# Patient Record
Sex: MALE | Race: BLACK OR AFRICAN AMERICAN | NOT HISPANIC OR LATINO
[De-identification: names, ages, dates, MRNs, and addresses within clinical notes are randomized per-mention and may not be internally consistent; named-entity substitution may affect disease eponyms.]

---

## 2023-08-11 ENCOUNTER — APPOINTMENT (OUTPATIENT)
Dept: INTERNAL MEDICINE | Facility: CLINIC | Age: 33
End: 2023-08-11
Payer: COMMERCIAL

## 2023-08-11 VITALS
HEART RATE: 68 BPM | DIASTOLIC BLOOD PRESSURE: 74 MMHG | OXYGEN SATURATION: 98 % | SYSTOLIC BLOOD PRESSURE: 112 MMHG | BODY MASS INDEX: 25.31 KG/M2 | WEIGHT: 191 LBS | HEIGHT: 73 IN

## 2023-08-11 DIAGNOSIS — M54.50 LOW BACK PAIN, UNSPECIFIED: ICD-10-CM

## 2023-08-11 PROBLEM — Z00.00 ENCOUNTER FOR PREVENTIVE HEALTH EXAMINATION: Status: ACTIVE | Noted: 2023-08-11

## 2023-08-11 PROCEDURE — 99204 OFFICE O/P NEW MOD 45 MIN: CPT

## 2023-08-11 RX ORDER — METHOCARBAMOL 750 MG/1
750 TABLET, FILM COATED ORAL EVERY 8 HOURS
Qty: 30 | Refills: 0 | Status: ACTIVE | COMMUNITY
Start: 2023-08-11 | End: 1900-01-01

## 2023-08-11 NOTE — HEALTH RISK ASSESSMENT
[HIV Test offered] : HIV Test offered [Hepatitis C test offered] : Hepatitis C test offered [Change in mental status noted] : No change in mental status noted [Language] : denies difficulty with language [Behavior] : denies difficulty with behavior [Learning/Retaining New Information] : denies difficulty learning/retaining new information [Handling Complex Tasks] : denies difficulty handling complex tasks [Reasoning] : denies difficulty with reasoning [Spatial Ability and Orientation] : denies difficulty with spatial ability and orientation [None] : None [With Family] : lives with family [Employed] : employed [College] : College [Single] : single [Sexually Active] : sexually active [High Risk Behavior] : no high risk behavior [Feels Safe at Home] : Feels safe at home [Fully functional (bathing, dressing, toileting, transferring, walking, feeding)] : Fully functional (bathing, dressing, toileting, transferring, walking, feeding) [Fully functional (using the telephone, shopping, preparing meals, housekeeping, doing laundry, using] : Fully functional and needs no help or supervision to perform IADLs (using the telephone, shopping, preparing meals, housekeeping, doing laundry, using transportation, managing medications and managing finances) [Reports changes in hearing] : Reports no changes in hearing [Reports changes in vision] : Reports no changes in vision [Reports normal functional visual acuity (ie: able to read med bottle)] : Reports normal functional visual acuity [Reports changes in dental health] : Reports no changes in dental health [Smoke Detector] : smoke detector [Carbon Monoxide Detector] : carbon monoxide detector [Guns at Home] : no guns at home [Safety elements used in home] : safety elements used in home [Seat Belt] :  uses seat belt [Sunscreen] : uses sunscreen [Travel to Developing Areas] : does not  travel to developing areas [TB Exposure] : is not being exposed to tuberculosis [Caregiver Concerns] : does not have caregiver concerns [No] : No [No falls in past year] : Patient reported no falls in the past year [0] : 2) Feeling down, depressed, or hopeless: Not at all (0) [PHQ-2 Negative - No further assessment needed] : PHQ-2 Negative - No further assessment needed [Never] : Never [[id number]] : 0

## 2023-08-11 NOTE — PHYSICAL EXAM
[No Acute Distress] : no acute distress [Well Nourished] : well nourished [Well Developed] : well developed [Well-Appearing] : well-appearing [Normal Sclera/Conjunctiva] : normal sclera/conjunctiva [PERRL] : pupils equal round and reactive to light [EOMI] : extraocular movements intact [Normal Outer Ear/Nose] : the outer ears and nose were normal in appearance [Normal Oropharynx] : the oropharynx was normal [No Lymphadenopathy] : no lymphadenopathy [Supple] : supple [Thyroid Normal, No Nodules] : the thyroid was normal and there were no nodules present [No Respiratory Distress] : no respiratory distress  [No Accessory Muscle Use] : no accessory muscle use [Clear to Auscultation] : lungs were clear to auscultation bilaterally [Normal Rate] : normal rate  [Regular Rhythm] : with a regular rhythm [Normal S1, S2] : normal S1 and S2 [No Murmur] : no murmur heard [No Edema] : there was no peripheral edema [Declined Breast Exam] : declined breast exam  [Soft] : abdomen soft [Non Tender] : non-tender [Non-distended] : non-distended [No Masses] : no abdominal mass palpated [No HSM] : no HSM [Normal Bowel Sounds] : normal bowel sounds [Declined Rectal Exam] : declined rectal exam [Normal Posterior Cervical Nodes] : no posterior cervical lymphadenopathy [Normal Anterior Cervical Nodes] : no anterior cervical lymphadenopathy [No CVA Tenderness] : no CVA  tenderness [No Spinal Tenderness] : no spinal tenderness [No Rash] : no rash [Coordination Grossly Intact] : coordination grossly intact [No Focal Deficits] : no focal deficits [Normal Gait] : normal gait [Deep Tendon Reflexes (DTR)] : deep tendon reflexes were 2+ and symmetric [de-identified] : declined [Normal] : normal gait, coordination grossly intact, no focal deficits and deep tendon reflexes were 2+ and symmetric [No Joint Swelling] : no joint swelling [Normal Affect] : the affect was normal [Grossly Normal Strength/Tone] : grossly normal strength/tone [Normal Insight/Judgement] : insight and judgment were intact [de-identified] : straight leg test negative b/l

## 2023-08-11 NOTE — REVIEW OF SYSTEMS
[Back Pain] : back pain [Negative] : Musculoskeletal [Joint Stiffness] : no joint stiffness [Joint Pain] : no joint pain [Muscle Pain] : no muscle pain [Muscle Weakness] : no muscle weakness [Joint Swelling] : no joint swelling

## 2023-08-11 NOTE — PHYSICAL EXAM
[No Acute Distress] : no acute distress [Well Nourished] : well nourished [Well Developed] : well developed [Well-Appearing] : well-appearing [Normal Sclera/Conjunctiva] : normal sclera/conjunctiva [PERRL] : pupils equal round and reactive to light [EOMI] : extraocular movements intact [Normal Outer Ear/Nose] : the outer ears and nose were normal in appearance [Normal Oropharynx] : the oropharynx was normal [No Lymphadenopathy] : no lymphadenopathy [Supple] : supple [Thyroid Normal, No Nodules] : the thyroid was normal and there were no nodules present [No Respiratory Distress] : no respiratory distress  [No Accessory Muscle Use] : no accessory muscle use [Clear to Auscultation] : lungs were clear to auscultation bilaterally [Normal Rate] : normal rate  [Regular Rhythm] : with a regular rhythm [Normal S1, S2] : normal S1 and S2 [No Murmur] : no murmur heard [No Edema] : there was no peripheral edema [Declined Breast Exam] : declined breast exam  [Soft] : abdomen soft [Non Tender] : non-tender [Non-distended] : non-distended [No Masses] : no abdominal mass palpated [No HSM] : no HSM [Normal Bowel Sounds] : normal bowel sounds [Declined Rectal Exam] : declined rectal exam [Normal Posterior Cervical Nodes] : no posterior cervical lymphadenopathy [Normal Anterior Cervical Nodes] : no anterior cervical lymphadenopathy [No CVA Tenderness] : no CVA  tenderness [No Spinal Tenderness] : no spinal tenderness [No Rash] : no rash [Coordination Grossly Intact] : coordination grossly intact [No Focal Deficits] : no focal deficits [Normal Gait] : normal gait [Deep Tendon Reflexes (DTR)] : deep tendon reflexes were 2+ and symmetric [de-identified] : declined [Normal] : normal gait, coordination grossly intact, no focal deficits and deep tendon reflexes were 2+ and symmetric [No Joint Swelling] : no joint swelling [Normal Affect] : the affect was normal [Grossly Normal Strength/Tone] : grossly normal strength/tone [Normal Insight/Judgement] : insight and judgment were intact [de-identified] : straight leg test negative b/l

## 2023-08-11 NOTE — REVIEW OF SYSTEMS
[Back Pain] : back pain [Negative] : Musculoskeletal [Joint Pain] : no joint pain [Joint Stiffness] : no joint stiffness [Muscle Pain] : no muscle pain [Muscle Weakness] : no muscle weakness [Joint Swelling] : no joint swelling

## 2023-08-11 NOTE — HISTORY OF PRESENT ILLNESS
[de-identified] : 33 year M here for Complete Physical Exam. Pt is daily exercising?  Yes or No Vaccinations: covid  flu  tdap hpv  Screening: colonoscopy: denies any unintentional weight loss, blood in stool, anemia or dysphagia of solids or liquids no family hx of colon cancer  -Past Medical History:   -Allergies:  -Medications:  -Surgical Hx:  -Family Hx: Mother Father Maternal Grandfather Maternal Grandmother Paternal Grandfather Paternal Grandmother Siblings: Children: :   pt denies any family hx of breast, colon, cervical, endometrial, uterine, ovarian,  lung, prostate  or testicular cancer  -Social ETOH - socially  Smoker - denies  Illicit Drug use - denies  -Occupation:  Pt has no acute complaints  [FreeTextEntry8] : 33 M  Patient c/o of back pain located L side x 1 month. Pt [place] was [place] beach [place] up [place] and felt sharp pain in left side of back and reports pain was severe. Pt went to urgent care via tele health with [facility].  the pain started on 2/10 located on left side radiates to back only. aggravated by movement and exertion.  alleviated by lying down, standing better.  no trauma or fall or injury associated with no other sx.  taking ibuprofen yesterday - mild relief was rx muscle relaxant by tele visit - no relief  ice/heat for pain denies.  Pt reports no numbness, tingling, weakness or loss of sensation.  No fever, no rash, no weakness or sensory changes No urinary retention or overflow incontinence, no bowel movement changes or saddle paresthesia. No history of cancer  PMHX: Asthma Allergies: NKDA, nut allergry - lips swelling and throat swelling but no anaphylaxis  Medications: Albuterol Inhaler  Surgical Hx: ACL surgery R knee  [place] Teeth  Family Hx; Mother : healthy and alive Father healthy and alive Siblings healthy and alive Social Hx ETOH  -  socially Tobacco - denies Illict Drug use - denies Occupation:  - [place]

## 2023-08-11 NOTE — ASSESSMENT
[FreeTextEntry1] : #Left side back pain - f/u MRI of lumbar - f/u PT - f/u Pain managment - robaxin 750mg q 8 hours prn for pain - tylenol for pain no red flags Physical activity as tolerated, encouraged pt to stay active Reassured pain will most likely resolve in a few weeks Lifestyle modification, good lifting techniques   pt agrees and understands plan via teach back method. all questions answered. A total of 45 minutes including same day pre-visit chart review, face to face time with patient, coordination of care, and documentation was spent on this visit.

## 2023-08-11 NOTE — HISTORY OF PRESENT ILLNESS
[de-identified] : 33 year M here for Complete Physical Exam. Pt is daily exercising?  Yes or No Vaccinations: covid  flu  tdap hpv  Screening: colonoscopy: denies any unintentional weight loss, blood in stool, anemia or dysphagia of solids or liquids no family hx of colon cancer  -Past Medical History:   -Allergies:  -Medications:  -Surgical Hx:  -Family Hx: Mother Father Maternal Grandfather Maternal Grandmother Paternal Grandfather Paternal Grandmother Siblings: Children: :   pt denies any family hx of breast, colon, cervical, endometrial, uterine, ovarian,  lung, prostate  or testicular cancer  -Social ETOH - socially  Smoker - denies  Illicit Drug use - denies  -Occupation:  Pt has no acute complaints  [FreeTextEntry8] : 33 M  Patient c/o of back pain located L side x 1 month. Pt reports was at beach picking up son and felt sharp pain in left side of back and reports pain was severe. Pt went to urgent care via tele health with [facility].  the pain started on 2/10 located on left side radiates to back only. aggravated by movement and exertion.  alleviated by lying down, standing better.  no trauma or fall or injury associated with no other sx.  taking ibuprofen yesterday - mild relief was rx muscle relaxant by tele visit - no relief  ice/heat for pain denies.  Pt reports no numbness, tingling, weakness or loss of sensation.  No fever, no rash, no weakness or sensory changes No urinary retention or overflow incontinence, no bowel movement changes or saddle paresthesia. No history of cancer  PMHX: Asthma Allergies: NKDA, nut allergry - lips swelling and throat swelling but no anaphylaxis  Medications: Albuterol Inhaler  Surgical Hx: ACL surgery R knee  [place] Teeth  Family Hx; Mother : healthy and alive Father healthy and alive Siblings healthy and alive Social Hx ETOH  -  socially Tobacco - denies Illict Drug use - denies Occupation:  - [place]

## 2023-08-11 NOTE — HEALTH RISK ASSESSMENT
[HIV Test offered] : HIV Test offered [Hepatitis C test offered] : Hepatitis C test offered [Change in mental status noted] : No change in mental status noted [Language] : denies difficulty with language [Behavior] : denies difficulty with behavior [Learning/Retaining New Information] : denies difficulty learning/retaining new information [Handling Complex Tasks] : denies difficulty handling complex tasks [Reasoning] : denies difficulty with reasoning [Spatial Ability and Orientation] : denies difficulty with spatial ability and orientation [None] : None [With Family] : lives with family [Employed] : employed [College] : College [Single] : single [Sexually Active] : sexually active [High Risk Behavior] : no high risk behavior [Feels Safe at Home] : Feels safe at home [Fully functional (bathing, dressing, toileting, transferring, walking, feeding)] : Fully functional (bathing, dressing, toileting, transferring, walking, feeding) [Fully functional (using the telephone, shopping, preparing meals, housekeeping, doing laundry, using] : Fully functional and needs no help or supervision to perform IADLs (using the telephone, shopping, preparing meals, housekeeping, doing laundry, using transportation, managing medications and managing finances) [Reports changes in hearing] : Reports no changes in hearing [Reports changes in vision] : Reports no changes in vision [Reports normal functional visual acuity (ie: able to read med bottle)] : Reports normal functional visual acuity [Reports changes in dental health] : Reports no changes in dental health [Smoke Detector] : smoke detector [Carbon Monoxide Detector] : carbon monoxide detector [Guns at Home] : no guns at home [Safety elements used in home] : safety elements used in home [Seat Belt] :  uses seat belt [Sunscreen] : uses sunscreen [Travel to Developing Areas] : does not  travel to developing areas [TB Exposure] : is not being exposed to tuberculosis [Caregiver Concerns] : does not have caregiver concerns [No] : No [No falls in past year] : Patient reported no falls in the past year [0] : 2) Feeling down, depressed, or hopeless: Not at all (0) [PHQ-2 Negative - No further assessment needed] : PHQ-2 Negative - No further assessment needed [Never] : Never [[id number]] : 0

## 2023-08-18 ENCOUNTER — APPOINTMENT (OUTPATIENT)
Dept: MRI IMAGING | Facility: CLINIC | Age: 33
End: 2023-08-18
Payer: SELF-PAY

## 2023-08-18 PROCEDURE — 72148 MRI LUMBAR SPINE W/O DYE: CPT

## 2023-08-21 ENCOUNTER — NON-APPOINTMENT (OUTPATIENT)
Age: 33
End: 2023-08-21

## 2023-08-21 DIAGNOSIS — N28.1 CYST OF KIDNEY, ACQUIRED: ICD-10-CM

## 2023-08-24 ENCOUNTER — APPOINTMENT (OUTPATIENT)
Dept: ORTHOPEDIC SURGERY | Facility: CLINIC | Age: 33
End: 2023-08-24
Payer: COMMERCIAL

## 2023-08-24 VITALS
OXYGEN SATURATION: 97 % | BODY MASS INDEX: 24.38 KG/M2 | DIASTOLIC BLOOD PRESSURE: 78 MMHG | SYSTOLIC BLOOD PRESSURE: 114 MMHG | HEART RATE: 70 BPM | WEIGHT: 190 LBS | HEIGHT: 74 IN

## 2023-08-24 DIAGNOSIS — M48.07 SPINAL STENOSIS, LUMBOSACRAL REGION: ICD-10-CM

## 2023-08-24 PROCEDURE — 99204 OFFICE O/P NEW MOD 45 MIN: CPT

## 2023-08-24 RX ORDER — METHYLPREDNISOLONE 4 MG/1
4 TABLET ORAL
Qty: 1 | Refills: 1 | Status: ACTIVE | COMMUNITY
Start: 2023-08-24 | End: 1900-01-01

## 2023-08-24 NOTE — DISCUSSION/SUMMARY
[Medication Risks Reviewed] : Medication risks reviewed [Surgical risks reviewed] : Surgical risks reviewed [de-identified] : L4-L5 left herniation. Discussed all options. Referral for physical therapy.  If no better, APOLLO with pain management. Risks of surgery include infection, dural tear, nerve root injury, reherniation, future leg pain, future back pain, retained fragment, hematoma, urinary retention, worsening leg symptoms, foot drop, anesthetic risks, blood transfusion risks, positioning pain, visceral vascular injury, deep vein thrombosis, pulmonary embolus, and death. All risks were explained not exclusive to the ones mentioned alternatives were discussed and all questions were answered the patient agrees and understands the above and is in complete agreement with the plan.  All options discussed including rest, medicine, home exercise, acupuncture, Chiropractic care, Physical Therapy, Pain management, and last resort surgery. All questions were answered, all alternatives discussed, and the patient is in complete agreement with the treatment plan which the patient contributed to and discussed with me through the shared decision-making process. Follow-up appointment as instructed. Any issues and the patient will call or come in sooner.

## 2023-08-24 NOTE — PHYSICAL EXAM
[Normal] : Gait: normal [SLR] : positive straight leg raise [Camarena's Sign] : negative Camarena's sign [Pronator Drift] : negative pronator drift [de-identified] : 5 out of 5 motor strength, sensation is intact and symmetrical full range of motion flexion extension and rotation, no palpatory tenderness full range of motion of hips knees shoulders and elbows (all four extremities), no atrophy, negative straight leg raise, no pathological reflexes, no swelling, normal ambulation, no apparent distress skin is intact, no palpable lymph nodes, no upper or lower extremity instability, alert and oriented x3 and normal mood. Normal finger-to nose test.  4+/5 left foot +left SLR [de-identified] : I reviewed, interpreted and clinically correlated the following outside imaging studies,   EXAM: [id number] - MR SPINE LUMBAR  - ORDERED BY: IVONNE PARR   PROCEDURE DATE:  08/18/2023    INTERPRETATION:  CLINICAL STATEMENT: Left lower back pain.  TECHNIQUE Multiplanar multisequence noncontrast MRI of the lumbar spine. COMPARISON: No similar prior studies for comparison.  FINDINGS:  No compression fracture, spondylolisthesis or marrow replacement process. No visualized intrathecal, paraspinal or retroperitoneal mass. Probable partially imaged right renal cortical cyst incidentally noted, however incompletely characterized on this exam. Clarification can be performed with renal ultrasound.  Shallow leftward curvature. L3-L4, L4-L5 and L5-S1 disc space narrowing with disc desiccation at these levels. Multilevel lower lumbar facet arthrosis.  L1-L2: No disc herniation or stenosis. L2-L3: No disc herniation or stenosis. L3-L4: Disc bulge, resulting in mild-moderate bilateral neural foramen stenosis with facet arthrosis. L4-L5: Extruded left paracentral disc herniation superimposed upon a disc bulge, demonstrating approximate 1.1 cm inferior migration of disc material, impinging upon the thecal sac, resulting in severe central, effacement of the left lateral recess and mild-moderate bilateral neural foramen stenosis with facet arthrosis, impinging upon the left L5 nerve roots. L5-S1: Disc bulge with central annular tear, impinging upon the thecal sac, resulting in mild central canal and mild bilateral neural foramen stenosis with facet arthrosis.  IMPRESSION: 1. L4-L5 extruded LEFT paracentral disc herniation superimposed upon a disc bulge, demonstrating approximate 1.1 cm inferior migration of disc material, impinging upon the thecal sac, resulting in severe central, effacement of the left lateral recess and mild-moderate bilateral neural foramen stenosis with facet arthrosis, impinging upon the left L5 nerve roots. 2. Additional findings described in detail above. (4) no impairment

## 2023-08-24 NOTE — HISTORY OF PRESENT ILLNESS
[de-identified] : 33 year old male presents for evaluation of lower back and left leg pain since July 8th. He states that he was [place] and got [place] by a wave and felt the pain. Pain radiates down the LLE posterolaterally to the calf.  Has numbness/tingling of the left great toe. Sitting aggravates the pain. Has been taking ibuprofen and was prescribed methocarbamol by his PCP which helped his back pain but is still having the leg pain. Has been participating with chiropractic care for the past two weeks which has been helpful, but still having pain.  Had lumbar MRI 8/18/23. No fever, chills, sweats, nausea/vomiting. No bowel or bladder dysfunction, no recent weight loss or gain. No night pain. This history is in addition to the intake form that I personally reviewed.  [Stable] : stable

## 2023-08-24 NOTE — ADDENDUM
[FreeTextEntry1] : This note was written by Reid Payton on 08/24/2023 acting as scribe for Dr. Cam White M.D.  I, Cam White MD, have read and attest that all the information, medical decision making and discharge instructions within are true and accurate.

## 2023-09-07 ENCOUNTER — APPOINTMENT (OUTPATIENT)
Dept: ORTHOPEDIC SURGERY | Facility: CLINIC | Age: 33
End: 2023-09-07
Payer: COMMERCIAL

## 2023-09-07 VITALS — WEIGHT: 190 LBS | BODY MASS INDEX: 24.38 KG/M2 | HEIGHT: 74 IN

## 2023-09-07 PROCEDURE — 99214 OFFICE O/P EST MOD 30 MIN: CPT

## 2023-09-07 RX ORDER — DICLOFENAC SODIUM 75 MG/1
75 TABLET, DELAYED RELEASE ORAL TWICE DAILY
Qty: 90 | Refills: 0 | Status: ACTIVE | COMMUNITY
Start: 2023-09-07 | End: 1900-01-01

## 2023-09-07 NOTE — DISCUSSION/SUMMARY
[Medication Risks Reviewed] : Medication risks reviewed [Surgical risks reviewed] : Surgical risks reviewed [de-identified] : L4-L5 left herniation. Pain is now a 2/10 down from 10/10. Discussed all options. Diclofenac PRN. F/U 3 weeks. All options discussed including rest, medicine, home exercise, acupuncture, Chiropractic care, Physical Therapy, Pain management, and last resort surgery. All questions were answered, all alternatives discussed, and the patient is in complete agreement with the treatment plan which the patient contributed to and discussed with me through the shared decision-making process. Follow-up appointment as instructed. Any issues and the patient will call or come in sooner.

## 2023-09-07 NOTE — ADDENDUM
[FreeTextEntry1] : This note was written by eRid Payton on 09/07/2023 acting as scribe for Dr. Cam White M.D.  I, Cam White MD, have read and attest that all the information, medical decision making and discharge instructions within are true and accurate.

## 2023-09-07 NOTE — PHYSICAL EXAM
[Normal] : Gait: normal [SLR] : positive straight leg raise [Camarena's Sign] : negative Camarena's sign [Pronator Drift] : negative pronator drift [de-identified] : 5 out of 5 motor strength, sensation is intact and symmetrical full range of motion flexion extension and rotation, no palpatory tenderness full range of motion of hips knees shoulders and elbows (all four extremities), no atrophy, negative straight leg raise, no pathological reflexes, no swelling, normal ambulation, no apparent distress skin is intact, no palpable lymph nodes, no upper or lower extremity instability, alert and oriented x3 and normal mood. Normal finger-to nose test.  4+/5 left foot +left SLR [de-identified] : I reviewed, interpreted and clinically correlated the following outside imaging studies,   EXAM: [id number] - MR SPINE LUMBAR  - ORDERED BY: IVONNE PARR   PROCEDURE DATE:  08/18/2023    INTERPRETATION:  CLINICAL STATEMENT: Left lower back pain.  TECHNIQUE Multiplanar multisequence noncontrast MRI of the lumbar spine. COMPARISON: No similar prior studies for comparison.  FINDINGS:  No compression fracture, spondylolisthesis or marrow replacement process. No visualized intrathecal, paraspinal or retroperitoneal mass. Probable partially imaged right renal cortical cyst incidentally noted, however incompletely characterized on this exam. Clarification can be performed with renal ultrasound.  Shallow leftward curvature. L3-L4, L4-L5 and L5-S1 disc space narrowing with disc desiccation at these levels. Multilevel lower lumbar facet arthrosis.  L1-L2: No disc herniation or stenosis. L2-L3: No disc herniation or stenosis. L3-L4: Disc bulge, resulting in mild-moderate bilateral neural foramen stenosis with facet arthrosis. L4-L5: Extruded left paracentral disc herniation superimposed upon a disc bulge, demonstrating approximate 1.1 cm inferior migration of disc material, impinging upon the thecal sac, resulting in severe central, effacement of the left lateral recess and mild-moderate bilateral neural foramen stenosis with facet arthrosis, impinging upon the left L5 nerve roots. L5-S1: Disc bulge with central annular tear, impinging upon the thecal sac, resulting in mild central canal and mild bilateral neural foramen stenosis with facet arthrosis.  IMPRESSION: 1. L4-L5 extruded LEFT paracentral disc herniation superimposed upon a disc bulge, demonstrating approximate 1.1 cm inferior migration of disc material, impinging upon the thecal sac, resulting in severe central, effacement of the left lateral recess and mild-moderate bilateral neural foramen stenosis with facet arthrosis, impinging upon the left L5 nerve roots. 2. Additional findings described in detail above.

## 2023-09-07 NOTE — HISTORY OF PRESENT ILLNESS
[Stable] : stable [de-identified] : 33 year old male presents for evaluation of lower back and left leg pain since July 8th. He states that he [place] and [place] by a wave and felt the pain. Pain radiates down the LLE posterolaterally to the calf.  Has numbness/tingling of the left great toe. Sitting aggravates the pain. Has been taking ibuprofen and was prescribed methocarbamol by his PCP which helped his back pain but is still having the leg pain. Has been participating with chiropractic care for the past two weeks which has been helpful, but still having pain.  Had lumbar MRI 8/18/23. Was referred to PT at last visit. He states that he has had 2 sessions of PT so far which he states is helping. No fever, chills, sweats, nausea/vomiting. No bowel or bladder dysfunction, no recent weight loss or gain. No night pain. This history is in addition to the intake form that I personally reviewed.

## 2023-09-28 ENCOUNTER — APPOINTMENT (OUTPATIENT)
Dept: ORTHOPEDIC SURGERY | Facility: CLINIC | Age: 33
End: 2023-09-28
Payer: COMMERCIAL

## 2023-09-28 VITALS — WEIGHT: 190 LBS | BODY MASS INDEX: 24.38 KG/M2 | HEIGHT: 74 IN

## 2023-09-28 DIAGNOSIS — M51.26 OTHER INTERVERTEBRAL DISC DISPLACEMENT, LUMBAR REGION: ICD-10-CM

## 2023-09-28 DIAGNOSIS — M54.16 RADICULOPATHY, LUMBAR REGION: ICD-10-CM

## 2023-09-28 PROCEDURE — 99214 OFFICE O/P EST MOD 30 MIN: CPT

## 2023-10-09 ENCOUNTER — NON-APPOINTMENT (OUTPATIENT)
Age: 33
End: 2023-10-09

## 2023-10-09 ENCOUNTER — APPOINTMENT (OUTPATIENT)
Dept: INTERNAL MEDICINE | Facility: CLINIC | Age: 33
End: 2023-10-09
Payer: COMMERCIAL

## 2023-10-09 VITALS
OXYGEN SATURATION: 97 % | DIASTOLIC BLOOD PRESSURE: 72 MMHG | SYSTOLIC BLOOD PRESSURE: 110 MMHG | RESPIRATION RATE: 16 BRPM | HEART RATE: 53 BPM | HEIGHT: 74 IN | TEMPERATURE: 98.7 F | BODY MASS INDEX: 24.26 KG/M2 | WEIGHT: 189 LBS

## 2023-10-09 DIAGNOSIS — Z29.9 ENCOUNTER FOR PROPHYLACTIC MEASURES, UNSPECIFIED: ICD-10-CM

## 2023-10-09 DIAGNOSIS — Z23 ENCOUNTER FOR IMMUNIZATION: ICD-10-CM

## 2023-10-09 DIAGNOSIS — Z00.00 ENCOUNTER FOR GENERAL ADULT MEDICAL EXAMINATION W/OUT ABNORMAL FINDINGS: ICD-10-CM

## 2023-10-09 DIAGNOSIS — Z13.31 ENCOUNTER FOR SCREENING FOR DEPRESSION: ICD-10-CM

## 2023-10-09 PROCEDURE — 36415 COLL VENOUS BLD VENIPUNCTURE: CPT

## 2023-10-09 PROCEDURE — G0008: CPT

## 2023-10-09 PROCEDURE — 93000 ELECTROCARDIOGRAM COMPLETE: CPT

## 2023-10-09 PROCEDURE — 99395 PREV VISIT EST AGE 18-39: CPT | Mod: 25

## 2023-10-09 PROCEDURE — 90686 IIV4 VACC NO PRSV 0.5 ML IM: CPT

## 2023-10-10 ENCOUNTER — NON-APPOINTMENT (OUTPATIENT)
Age: 33
End: 2023-10-10

## 2023-10-10 DIAGNOSIS — R79.89 OTHER SPECIFIED ABNORMAL FINDINGS OF BLOOD CHEMISTRY: ICD-10-CM

## 2023-10-10 DIAGNOSIS — R82.90 UNSPECIFIED ABNORMAL FINDINGS IN URINE: ICD-10-CM

## 2023-10-10 LAB
25(OH)D3 SERPL-MCNC: 29.7 NG/ML
ALBUMIN SERPL ELPH-MCNC: 4.6 G/DL
ALP BLD-CCNC: 49 U/L
ALT SERPL-CCNC: 20 U/L
ANION GAP SERPL CALC-SCNC: 11 MMOL/L
APPEARANCE: CLEAR
AST SERPL-CCNC: 30 U/L
BACTERIA: NEGATIVE /HPF
BILIRUB SERPL-MCNC: 1 MG/DL
BILIRUBIN URINE: NEGATIVE
BLOOD URINE: NEGATIVE
BUN SERPL-MCNC: 19 MG/DL
CALCIUM SERPL-MCNC: 9.4 MG/DL
CAST: 0 /LPF
CHLORIDE SERPL-SCNC: 104 MMOL/L
CHOLEST SERPL-MCNC: 159 MG/DL
CO2 SERPL-SCNC: 24 MMOL/L
COLOR: YELLOW
CREAT SERPL-MCNC: 1.31 MG/DL
EGFR: 74 ML/MIN/1.73M2
EPITHELIAL CELLS: 0 /HPF
ESTIMATED AVERAGE GLUCOSE: 108 MG/DL
FOLATE SERPL-MCNC: 5.5 NG/ML
GLUCOSE QUALITATIVE U: NEGATIVE MG/DL
GLUCOSE SERPL-MCNC: 78 MG/DL
HBA1C MFR BLD HPLC: 5.4 %
HBV SURFACE AB SERPL IA-ACNC: 155.2 MIU/ML
HCT VFR BLD CALC: 42.7 %
HCV AB SER QL: NONREACTIVE
HCV S/CO RATIO: 0.13 S/CO
HDLC SERPL-MCNC: 53 MG/DL
HGB BLD-MCNC: 13.1 G/DL
KETONES URINE: NEGATIVE MG/DL
LDLC SERPL CALC-MCNC: 95 MG/DL
LEUKOCYTE ESTERASE URINE: NEGATIVE
MCHC RBC-ENTMCNC: 25.8 PG
MCHC RBC-ENTMCNC: 30.7 GM/DL
MCV RBC AUTO: 84.1 FL
MICROSCOPIC-UA: NORMAL
NITRITE URINE: NEGATIVE
NONHDLC SERPL-MCNC: 106 MG/DL
PH URINE: 5.5
PLATELET # BLD AUTO: 233 K/UL
POTASSIUM SERPL-SCNC: 4.3 MMOL/L
PROT SERPL-MCNC: 7.1 G/DL
PROTEIN URINE: 30 MG/DL
RBC # BLD: 5.08 M/UL
RBC # FLD: 12.3 %
RED BLOOD CELLS URINE: 0 /HPF
SODIUM SERPL-SCNC: 139 MMOL/L
SPECIFIC GRAVITY URINE: 1.03
TRIGL SERPL-MCNC: 55 MG/DL
TSH SERPL-ACNC: 0.82 UIU/ML
UROBILINOGEN URINE: 0.2 MG/DL
VIT B12 SERPL-MCNC: 999 PG/ML
WBC # FLD AUTO: 6.33 K/UL
WHITE BLOOD CELLS URINE: 0 /HPF

## 2025-03-14 ENCOUNTER — APPOINTMENT (OUTPATIENT)
Dept: ORTHOPEDIC SURGERY | Facility: CLINIC | Age: 35
End: 2025-03-14
Payer: COMMERCIAL

## 2025-03-14 DIAGNOSIS — M51.369: ICD-10-CM

## 2025-03-14 PROCEDURE — 99214 OFFICE O/P EST MOD 30 MIN: CPT | Mod: 95

## 2025-03-14 RX ORDER — DICLOFENAC SODIUM 75 MG/1
75 TABLET, DELAYED RELEASE ORAL
Qty: 60 | Refills: 1 | Status: ACTIVE | COMMUNITY
Start: 2025-03-14 | End: 1900-01-01